# Patient Record
Sex: MALE | Race: WHITE
[De-identification: names, ages, dates, MRNs, and addresses within clinical notes are randomized per-mention and may not be internally consistent; named-entity substitution may affect disease eponyms.]

---

## 2018-06-25 ENCOUNTER — HOSPITAL ENCOUNTER (OUTPATIENT)
Dept: HOSPITAL 17 - PHRSP | Age: 78
End: 2018-06-25
Attending: INTERNAL MEDICINE
Payer: MEDICARE

## 2018-06-25 DIAGNOSIS — G47.30: Primary | ICD-10-CM

## 2018-06-25 PROCEDURE — 94729 DIFFUSING CAPACITY: CPT

## 2018-06-25 PROCEDURE — 94618 PULMONARY STRESS TESTING: CPT

## 2018-06-25 PROCEDURE — 94060 EVALUATION OF WHEEZING: CPT

## 2018-06-27 NOTE — RSPPFT
DATE OF PROCEDURE:   6/25/18



COMMENTS:   



Spirometry with FVC of 3.7 predicted 3.5, FEV1 of 3.0 predicted 2.7, FEV1/FVC ratio 84% 
predicted 78%. Lung volumes are within the predicted range as well as the DLCO.   



IMPRESSION:    

   

On the basis of the above, patient has flow values, lung volumes and DLCO and flow volume 
loop are within the predicted range.